# Patient Record
Sex: FEMALE | Race: WHITE | URBAN - METROPOLITAN AREA
[De-identification: names, ages, dates, MRNs, and addresses within clinical notes are randomized per-mention and may not be internally consistent; named-entity substitution may affect disease eponyms.]

---

## 2020-02-20 ENCOUNTER — OUTPATIENT (OUTPATIENT)
Dept: OUTPATIENT SERVICES | Facility: HOSPITAL | Age: 50
LOS: 1 days | Discharge: HOME | End: 2020-02-20
Payer: MEDICAID

## 2020-02-20 DIAGNOSIS — Z12.31 ENCOUNTER FOR SCREENING MAMMOGRAM FOR MALIGNANT NEOPLASM OF BREAST: ICD-10-CM

## 2020-02-20 PROCEDURE — 77063 BREAST TOMOSYNTHESIS BI: CPT | Mod: 26

## 2020-02-20 PROCEDURE — 77067 SCR MAMMO BI INCL CAD: CPT | Mod: 26

## 2020-03-06 ENCOUNTER — OUTPATIENT (OUTPATIENT)
Dept: OUTPATIENT SERVICES | Facility: HOSPITAL | Age: 50
LOS: 1 days | Discharge: HOME | End: 2020-03-06
Payer: OTHER GOVERNMENT

## 2020-03-06 DIAGNOSIS — R92.8 OTHER ABNORMAL AND INCONCLUSIVE FINDINGS ON DIAGNOSTIC IMAGING OF BREAST: ICD-10-CM

## 2020-03-06 PROCEDURE — 77065 DX MAMMO INCL CAD UNI: CPT | Mod: 26,LT

## 2020-03-06 PROCEDURE — G0279: CPT | Mod: 26

## 2020-03-06 PROCEDURE — 76641 ULTRASOUND BREAST COMPLETE: CPT | Mod: 26,LT

## 2020-10-29 ENCOUNTER — OUTPATIENT (OUTPATIENT)
Dept: OUTPATIENT SERVICES | Facility: HOSPITAL | Age: 50
LOS: 1 days | Discharge: HOME | End: 2020-10-29
Payer: MEDICAID

## 2020-10-29 DIAGNOSIS — R92.8 OTHER ABNORMAL AND INCONCLUSIVE FINDINGS ON DIAGNOSTIC IMAGING OF BREAST: ICD-10-CM

## 2020-10-29 PROCEDURE — 77065 DX MAMMO INCL CAD UNI: CPT | Mod: 26,LT

## 2020-10-29 PROCEDURE — G0279: CPT | Mod: 26

## 2021-04-27 ENCOUNTER — OUTPATIENT (OUTPATIENT)
Dept: OUTPATIENT SERVICES | Facility: HOSPITAL | Age: 51
LOS: 1 days | Discharge: HOME | End: 2021-04-27
Payer: MEDICAID

## 2021-04-27 DIAGNOSIS — R92.8 OTHER ABNORMAL AND INCONCLUSIVE FINDINGS ON DIAGNOSTIC IMAGING OF BREAST: ICD-10-CM

## 2021-04-27 PROCEDURE — 77066 DX MAMMO INCL CAD BI: CPT | Mod: 26

## 2021-04-27 PROCEDURE — G0279: CPT | Mod: 26

## 2021-10-28 ENCOUNTER — OUTPATIENT (OUTPATIENT)
Dept: OUTPATIENT SERVICES | Facility: HOSPITAL | Age: 51
LOS: 1 days | Discharge: HOME | End: 2021-10-28
Payer: MEDICAID

## 2021-10-28 DIAGNOSIS — R92.8 OTHER ABNORMAL AND INCONCLUSIVE FINDINGS ON DIAGNOSTIC IMAGING OF BREAST: ICD-10-CM

## 2021-10-28 PROCEDURE — G0279: CPT | Mod: 26

## 2021-10-28 PROCEDURE — 77065 DX MAMMO INCL CAD UNI: CPT | Mod: 26,LT

## 2022-05-16 ENCOUNTER — OUTPATIENT (OUTPATIENT)
Dept: OUTPATIENT SERVICES | Facility: HOSPITAL | Age: 52
LOS: 1 days | Discharge: HOME | End: 2022-05-16
Payer: MEDICAID

## 2022-05-16 DIAGNOSIS — R92.8 OTHER ABNORMAL AND INCONCLUSIVE FINDINGS ON DIAGNOSTIC IMAGING OF BREAST: ICD-10-CM

## 2022-05-16 PROCEDURE — G0279: CPT | Mod: 26

## 2022-05-16 PROCEDURE — 77066 DX MAMMO INCL CAD BI: CPT | Mod: 26

## 2022-10-31 ENCOUNTER — OUTPATIENT (OUTPATIENT)
Dept: OUTPATIENT SERVICES | Facility: HOSPITAL | Age: 52
LOS: 1 days | Discharge: HOME | End: 2022-10-31

## 2022-10-31 DIAGNOSIS — D25.9 LEIOMYOMA OF UTERUS, UNSPECIFIED: ICD-10-CM

## 2022-10-31 PROCEDURE — 72197 MRI PELVIS W/O & W/DYE: CPT | Mod: 26

## 2023-04-21 PROBLEM — Z00.00 ENCOUNTER FOR PREVENTIVE HEALTH EXAMINATION: Status: ACTIVE | Noted: 2023-04-21

## 2023-04-25 ENCOUNTER — APPOINTMENT (OUTPATIENT)
Dept: GYNECOLOGIC ONCOLOGY | Facility: CLINIC | Age: 53
End: 2023-04-25
Payer: COMMERCIAL

## 2023-04-25 VITALS
BODY MASS INDEX: 33.86 KG/M2 | WEIGHT: 184 LBS | DIASTOLIC BLOOD PRESSURE: 91 MMHG | SYSTOLIC BLOOD PRESSURE: 154 MMHG | HEIGHT: 62 IN

## 2023-04-25 PROCEDURE — 99204 OFFICE O/P NEW MOD 45 MIN: CPT

## 2023-04-25 NOTE — ASSESSMENT
[FreeTextEntry1] : 53 yo female, G 3 P 3003,( x 3) LMP  , with a history of uterine fibroids that are possibly increasing in size now presenting for second opinion with respect for surgical management. The patient is asymptomatic and denies any vaginal bleeding discharge or pelvic pain. She was evaluated by Dr. Rolon and informed that she needs an open hysterectomy. She does not desire surgery at this time and hence is here for a second opinion.\par

## 2023-04-25 NOTE — CHIEF COMPLAINT
[FreeTextEntry1] : The patient is scheduled for a hysterectomy with [facility] 5-18-23 is here for a second opinion.

## 2023-04-25 NOTE — HISTORY OF PRESENT ILLNESS
[FreeTextEntry1] : 53 yo female, G 3 P 3003,( x 3) LMP  , with a history of uterine fibroids that are possibly increasing in size now presenting for second opinion with respect for surgical management. The patient is asymptomatic and denies any vaginal bleeding discharge or pelvic pain. She was evaluated by Dr. Rolon and informed that she needs an open hysterectomy. She does not desire surgery at this time and hence is here for a second opinion.\par \par \par \par \par \par \par \par

## 2023-05-05 ENCOUNTER — OUTPATIENT (OUTPATIENT)
Dept: OUTPATIENT SERVICES | Facility: HOSPITAL | Age: 53
LOS: 1 days | End: 2023-05-05
Payer: COMMERCIAL

## 2023-05-05 DIAGNOSIS — Z12.31 ENCOUNTER FOR SCREENING MAMMOGRAM FOR MALIGNANT NEOPLASM OF BREAST: ICD-10-CM

## 2023-05-05 DIAGNOSIS — M79.671 PAIN IN RIGHT FOOT: ICD-10-CM

## 2023-05-05 PROCEDURE — 73650 X-RAY EXAM OF HEEL: CPT | Mod: 26,RT

## 2023-05-05 PROCEDURE — 77067 SCR MAMMO BI INCL CAD: CPT | Mod: 26

## 2023-05-05 PROCEDURE — 73650 X-RAY EXAM OF HEEL: CPT | Mod: RT

## 2023-05-05 PROCEDURE — 77063 BREAST TOMOSYNTHESIS BI: CPT | Mod: 26

## 2023-05-05 PROCEDURE — 77067 SCR MAMMO BI INCL CAD: CPT

## 2023-05-05 PROCEDURE — 77063 BREAST TOMOSYNTHESIS BI: CPT

## 2023-05-06 DIAGNOSIS — M79.671 PAIN IN RIGHT FOOT: ICD-10-CM

## 2023-05-06 DIAGNOSIS — Z12.31 ENCOUNTER FOR SCREENING MAMMOGRAM FOR MALIGNANT NEOPLASM OF BREAST: ICD-10-CM

## 2023-05-08 ENCOUNTER — OUTPATIENT (OUTPATIENT)
Dept: OUTPATIENT SERVICES | Facility: HOSPITAL | Age: 53
LOS: 1 days | End: 2023-05-08
Payer: COMMERCIAL

## 2023-05-08 ENCOUNTER — RESULT REVIEW (OUTPATIENT)
Age: 53
End: 2023-05-08

## 2023-05-08 DIAGNOSIS — D25.9 LEIOMYOMA OF UTERUS, UNSPECIFIED: ICD-10-CM

## 2023-05-08 DIAGNOSIS — Z00.8 ENCOUNTER FOR OTHER GENERAL EXAMINATION: ICD-10-CM

## 2023-05-08 PROCEDURE — 76830 TRANSVAGINAL US NON-OB: CPT

## 2023-05-08 PROCEDURE — 76830 TRANSVAGINAL US NON-OB: CPT | Mod: 26

## 2023-05-09 ENCOUNTER — NON-APPOINTMENT (OUTPATIENT)
Age: 53
End: 2023-05-09

## 2023-05-09 DIAGNOSIS — D25.9 LEIOMYOMA OF UTERUS, UNSPECIFIED: ICD-10-CM

## 2023-05-11 ENCOUNTER — APPOINTMENT (OUTPATIENT)
Dept: HEMATOLOGY ONCOLOGY | Facility: CLINIC | Age: 53
End: 2023-05-11

## 2023-05-18 NOTE — DISCUSSION/SUMMARY
[FreeTextEntry1] : REASON FOR VISIT:\par Ms. Nelly Fuller is a 52-year-old female who was referred by Dr. Missy Romero for cancer genetic counseling and risk assessment due to a family history of cancer. The patient was seen on 2023 through [facility]. The patient was unaccompanied.\par \par RELEVANT MEDICAL AND SURGICAL HISTORY:\par The patient reported no personal history of cancer. \par \par OTHER MEDICAL AND SURGICAL HISTORY:\par • None\par \par PAST OB/GYN HISTORY:\par Obstetrical History: \par Age at Menarche: 14\par Post-Menopausal: 51\par Age at First Live Birth: 27\par Oral Contraceptive Use: None\par Hormone Replacement Therapy: None\par \par CANCER SCREENING HISTORY: \par BREAST: Last mammogram 2023. Frequency: annual. No MRIs. Per patient benign bx 18-19y ago.\par GYN: Last visit 10/2022 with Dr. Rolon. Fibroid noted and was seen by Dr. Romero 4..\par Colon: None\par Skin: None\par \par SOCIAL HISTORY:\par • Tobacco-product use: None\par \par FAMILY HISTORY:\par Paternal ancestry was reported as [place]/ and maternal ancestry was reported as [place]. A detailed family history of cancer was ascertained, see below for pedigree. Of note:\par • Sister with breast cancer in her late 40s\par • Maternal aunt with breast cancer at 60 \par \par The remaining family history is unremarkable. According to the patient there are no other known cases of significant cancers in the family. To her knowledge no one else in the family has had germline testing for cancer susceptibility. \par 	\par RISK ASSESSMENT:\par Ms. Fuller's family history of cancer is suggestive of a hereditary cancer susceptibility syndrome. Variants in breast cancer susceptibility genes were of specific concern. \par 	 \par We recommended genetic testing for a breast/gyn cancers panel. This test analyzes 19 genes: ARNAV, BARD1, BRCA1, BRCA2, BRIP1, CDH1, CHEK2, EPCAM, MLH1, MSH2, MSH6, NF1, PALB2, PMS2, PTEN, RAD51C, RAD51D, STK11, TP53  \par \par We discussed the risks, benefits and limitations, financial cost and implications of genetic testing. We also discussed the psychosocial implications of genetic testing. Possible test results were reviewed with the patient, along with associated medical management options. The Genetic Information Non-discrimination Act ([place]) was also reviewed.\par \par The patient consented to the above-mentioned genetic testing panel. A sample was obtained from the patient in our clinic and will be sent to [facility] for analysis.\par \par PLAN:\par 1. The patient's sample will be sent to [facility] for analysis. \par 2. We will contact the patient once the results are available. Results generally return in 2-3 weeks.\par \par For any additional questions please call Cancer Genetics at [phone] or [phone].\par \par \par Maya Heath MS, [facility] [place]\par Genetic Counselor, Cancer Genetics\par \par \par

## 2023-05-25 ENCOUNTER — NON-APPOINTMENT (OUTPATIENT)
Age: 53
End: 2023-05-25

## 2023-05-25 NOTE — DISCUSSION/SUMMARY
[FreeTextEntry1] : REASON FOR VISIT:\par Ms. Nelly Fuller is a 52-year-old female who was called on 2023 for a discussion regarding her negative genetic test results related to hereditary cancer predisposition. \par \par RELEVANT MEDICAL AND SURGICAL HISTORY:\par The patient reported no personal history of cancer. \par \par OTHER MEDICAL AND SURGICAL HISTORY:\par • None\par \par PAST OB/GYN HISTORY:\par Obstetrical History: \par Age at Menarche: 14\par Post-Menopausal: 51\par Age at First Live Birth: 27\par Oral Contraceptive Use: None\par Hormone Replacement Therapy: None\par \par CANCER SCREENING HISTORY: \par BREAST: Last mammogram 2023. Frequency: annual. No MRIs. Per patient benign bx 18-19y ago.\par GYN: Last visit 10/2022 with Dr. Rolon. Fibroid noted and was seen by Dr. Romero 4..\par Colon: None\par Skin: None\par \par SOCIAL HISTORY:\par • Tobacco-product use: None\par \par TEST RESULTS: NEGATIVE\par \par NO pathogenic (disease-causing) variants or variants of uncertain significance were detected in the following genes: ARNAV, BARD1, BRCA1, BRCA2, BRIP1, CDH1, CHEK2, EPCAM, MLH1, MSH2, MSH6, NF1, PALB2, PMS2, PTEN, RAD51C, RAD51D, STK11, TP53\par \par RESULTS INTERPRETATION AND ASSESSMENT:\par Given Ms. Fuller’s current reported family history of cancer, and her negative genetic test results, the following screening guidelines and risk-reducing recommendations were discussed:\par • BREAST: Ms. Fuller's [facility] risk assessment is 5.8% 10-year risk and 17.4% lifetime risk. This does not put her in the high-risk category for breast cancer to qualify for breast MRI. In the absence of other indications, this patient should practice age-appropriate breast cancer screening as recommended for the general population. \par • OTHER: In the absence of other indications, the patient should practice age-appropriate cancer screening for all other organ systems as recommended for the general population.\par \par It is recommended that the patient discuss the importance of pursuing cancer genetic testing and counseling with her other relatives. There may be a pathogenic variant in the family which the patient did not inherit. We would be happy to meet with her family members or refer them to a genetic expert in their area.\par \par We also discussed the limitations of negative results:\par 1. The cause of the patient’s family history of cancer remains unknown. The cancer may have developed randomly, or due to environmental factors.  \par 2. This negative result does not completely rule out a hereditary basis for the reported history due to limitations in technology or a variant being present in an unidentified gene. \par 3. Variants in other genes would not be identified by this analysis, so this negative result does not rule out the low likelihood of having a mutation in a different hereditary cancer gene or the possibility of ever developing cancer.\par 4. It is possible there is a hereditary cancer predisposition gene mutation in the family, but the patient did not inherit it. \par \par Our knowledge of genetics and inherited cancer conditions is changing rapidly, therefore, we recommend that the patient contact our office, every 2 to 3 years, to discuss relevant advances in cancer genetics. We emphasize the importance of re-contacting us with updates regarding her personal and family history of cancer as well as any updates regarding additional cancer genetic test results performed for the patient and/or family members.  Such updates could possibly change our risk assessment and recommendations. \par \par PLAN:\par 1. Long-term management and surveillance should be based on the patient’s personal and family history and general population guidelines for all other cancers.\par 2. The patient was encouraged to contact us every 2-3 years to discuss relevant advances in cancer genetics, or sooner if there are any changes in her personal or family history of cancer.\par \par For any additional questions please call Cancer Genetics at [phone] or [phone].\par \par \par Maya Heath MS, [facility] [place]par Genetic Counselor, Cancer Genetics\par \par

## 2023-05-26 ENCOUNTER — APPOINTMENT (OUTPATIENT)
Dept: GYNECOLOGIC ONCOLOGY | Facility: CLINIC | Age: 53
End: 2023-05-26
Payer: COMMERCIAL

## 2023-05-26 VITALS
BODY MASS INDEX: 33.13 KG/M2 | HEIGHT: 62 IN | WEIGHT: 180 LBS | SYSTOLIC BLOOD PRESSURE: 159 MMHG | DIASTOLIC BLOOD PRESSURE: 96 MMHG

## 2023-05-26 PROCEDURE — 99214 OFFICE O/P EST MOD 30 MIN: CPT

## 2023-05-26 NOTE — HISTORY OF PRESENT ILLNESS
[FreeTextEntry1] : 53 yo female, G 3 P 3003,( x 3) LMP  , with a history of uterine fibroids that are possibly increasing in size now presenting for second opinion with respect for surgical management. The patient is asymptomatic and denies any vaginal bleeding discharge or pelvic pain. She was evaluated by Dr. Rolon and informed that she needs an open hysterectomy. She does not desire surgery at this time and hence is here for a second opinion.\par \par Genetic work up  \par NO pathogenic (disease-causing) variants or variants of uncertain significance were detected in the following genes: ARNAV, BARD1, BRCA1, BRCA2, BRIP1, CDH1, CHEK2, EPCAM, MLH1, MSH2, MSH6, NF1, PALB2, PMS2, PTEN, RAD51C, RAD51D, STK11, TP53\par \par Repeat sonogram\par FINDINGS:\par Uterus: 9.1 cm x 4.8 cm x 7.1 cm. 2 cm x 2.2 cm intramural fibroid. 6.7 x 6.3 x 5.7 cm fundal fibroid. These structures appear unchanged.\par Endometrium: 4 mm. Within normal limits.\par \par Right ovary: 2.0 cm x 1.3 cm x 1.8 cm. Within normal limits.\par Left ovary: 3.0 cm x 1.4 cm x 1.7 cm. Within normal limits.\par \par Fluid: None.\par \par IMPRESSION:\par Stable Fibroid uterus, without change.\par \par Thank you for the courtesy of this consult.\par \par Sincerely,\par \par Aditya Foley MD\par \par

## 2023-05-26 NOTE — DISCUSSION/SUMMARY
[Visit Time ___ Minutes] : [unfilled] minutes [Face to Face Time___ Minutes] : with [unfilled] minutes in face to face consultation. [Discuss Alternatives/Risks/Benefits w/Patient] : All alternatives, risks, and benefits were discussed with the patient/family and all questions were answered.  Patient expressed good understanding and appreciates the importance of follow up as recommended.

## 2023-05-26 NOTE — ASSESSMENT
[FreeTextEntry1] : 51 yo female, G 3 P 3003,( x 3) LMP  , with a history of uterine fibroids that are possibly increasing in size now presenting for second opinion with respect for surgical management. Her work up confirms stable fibroid uterus that continues to be asymptomatic.

## 2024-01-10 ENCOUNTER — RESULT REVIEW (OUTPATIENT)
Age: 54
End: 2024-01-10

## 2024-01-10 ENCOUNTER — OUTPATIENT (OUTPATIENT)
Dept: OUTPATIENT SERVICES | Facility: HOSPITAL | Age: 54
LOS: 1 days | End: 2024-01-10
Payer: COMMERCIAL

## 2024-01-10 DIAGNOSIS — R10.2 PELVIC AND PERINEAL PAIN: ICD-10-CM

## 2024-01-10 DIAGNOSIS — Z00.8 ENCOUNTER FOR OTHER GENERAL EXAMINATION: ICD-10-CM

## 2024-01-10 PROCEDURE — 76830 TRANSVAGINAL US NON-OB: CPT | Mod: 26

## 2024-01-10 PROCEDURE — 76830 TRANSVAGINAL US NON-OB: CPT

## 2024-01-11 DIAGNOSIS — R10.2 PELVIC AND PERINEAL PAIN: ICD-10-CM

## 2024-01-12 ENCOUNTER — NON-APPOINTMENT (OUTPATIENT)
Age: 54
End: 2024-01-12

## 2024-01-12 ENCOUNTER — APPOINTMENT (OUTPATIENT)
Dept: ORTHOPEDIC SURGERY | Facility: CLINIC | Age: 54
End: 2024-01-12
Payer: COMMERCIAL

## 2024-01-12 VITALS — HEIGHT: 62 IN

## 2024-01-12 PROCEDURE — 99203 OFFICE O/P NEW LOW 30 MIN: CPT

## 2024-01-12 NOTE — HISTORY OF PRESENT ILLNESS
[de-identified] : 53-year-old female for evaluation of right ankle pain.  Patient reports was walking 2 days ago when she missed stepped causing her to invert her right ankle and fall.  Reports pain and swelling laterally greater than medially.  She seen at [facility] MD where x-rays of right foot and ankle were taken which confirmed no acute fractures.  He was placed in an Aircast and advised follow-up with an orthopedic specialist.

## 2024-01-12 NOTE — DISCUSSION/SUMMARY
[de-identified] : Treatment plan is discussed:  I recommended anti-inflammatory medication. Patient agrees to taking Advil/Ibuprofen OTC as needed for pain. Benefits discussed. Confirmed no contraindication to NSAIDs.  I recommended patient rest, ice, compress, and elevate the ankle regularly. Encouraged activity modification as tolerable. Encouraged gentle range of motion to avoid stiffness. no gym /sports until follow-up evaluation.  The patient was placed in a right tall Cam walker boot.  Patient instructed to wear the boot at all times past when active and ambulating.  Patient may remove the boot when showering/sleeping.  Patient understands that the first 2 weeks are the worst in regard to pain and swelling. Patient understands that residual pain and swelling may last for up to 6 months-1 year.  All questions and concerns addressed to patient's satisfaction. Patient expresses full understanding of treatment plan. Patient will follow up in 3-4 weeks with for further evaluation treatment.

## 2024-01-12 NOTE — IMAGING
[de-identified] :  physical examination of the right ankle: Mild swelling appreciated laterally greater than medially.  No ecchymosis or erythema appreciated.  Skin is intact.  Patient tender to palpation over the ATFL.  No tenderness over the lateral or medial malleolus.  No tenderness of the deltoid ligament, PT FL, or CFL ligaments.  Stable to varus and valgus stress.  Able to bear weight and ambulate at this time however experiences pain when doing so.  Sensorimotor intact distally.  Neurovascularly intact.  X-rays of right foot and ankle taken in the office today:  No acute fractures, subluxations, or dislocations.

## 2024-02-05 ENCOUNTER — APPOINTMENT (OUTPATIENT)
Dept: ORTHOPEDIC SURGERY | Facility: CLINIC | Age: 54
End: 2024-02-05
Payer: COMMERCIAL

## 2024-02-05 DIAGNOSIS — S93.401A SPRAIN OF UNSPECIFIED LIGAMENT OF RIGHT ANKLE, INITIAL ENCOUNTER: ICD-10-CM

## 2024-02-05 PROCEDURE — 99203 OFFICE O/P NEW LOW 30 MIN: CPT

## 2024-02-06 PROBLEM — S93.401A SPRAIN OF RIGHT ANKLE, INITIAL ENCOUNTER: Status: ACTIVE | Noted: 2024-01-12

## 2024-02-06 NOTE — DATA REVIEWED
[FreeTextEntry1] : I reviewed the x-rays taken at her last visit which are negative for fracture or dislocation.

## 2024-02-06 NOTE — PHYSICAL EXAM
[de-identified] : She is alert oriented x 3.  Pleasant cooperative.  I examined her left lower extremity.  She is minimally tender at the malleoli.  Tender at the ATFL.  Ankle is stable.  Compartment soft compressible.  Alignment maintained.  Neurovascular intact distally.

## 2024-02-06 NOTE — HISTORY OF PRESENT ILLNESS
[de-identified] : Patient is here for follow-up of a right ankle sprain which occurred couple weeks ago.  She is doing well.  She really has much less pain.  She still gets some occasional discomfort over the ATFL.  Denies any pain elsewhere.

## 2024-02-06 NOTE — DISCUSSION/SUMMARY
[de-identified] : I discussed the patient's findings with her.  At this time I would like the patient to initiate a home exercise program as well as anti-inflammatory medication.  I will see her back on as-needed basis.  All questions sought and answered.

## 2024-04-05 ENCOUNTER — APPOINTMENT (OUTPATIENT)
Dept: GYNECOLOGIC ONCOLOGY | Facility: CLINIC | Age: 54
End: 2024-04-05
Payer: COMMERCIAL

## 2024-04-05 DIAGNOSIS — D25.1 INTRAMURAL LEIOMYOMA OF UTERUS: ICD-10-CM

## 2024-04-05 DIAGNOSIS — R10.2 PELVIC AND PERINEAL PAIN: ICD-10-CM

## 2024-04-05 DIAGNOSIS — D25.0 SUBMUCOUS LEIOMYOMA OF UTERUS: ICD-10-CM

## 2024-04-05 PROCEDURE — 99215 OFFICE O/P EST HI 40 MIN: CPT

## 2024-04-05 NOTE — PHYSICAL EXAM
[Chaperone Present] : A chaperone was present in the examining room during all aspects of the physical examination [TextEntry] : Well - developed, well-nourished female in no acute distress HEENT - wnl Breasts - symmetrical w/o masses or nipple discharge Abdomen - soft, non-tender, +BS Back - no CVA tenderness or spinal tenderness Extremities - w/o clubbing, cyanosis, no lesions noted Neuro - AAOx3  Pelvic Exam External Genitalia - with out lesions Vagina - mucosa atrophic, no lesions noted Cervix - no lesions Uterus - 16 wk size, nontender, mobile Adnexa - no palpable masses or tenderness b/l Rectovaginal - confirmatory

## 2024-04-05 NOTE — HISTORY OF PRESENT ILLNESS
[FreeTextEntry1] : 54 yo female, G 3 P 3003,( x 3) LMP  , with a history of uterine fibroids that are possibly increasing in size now presenting for second opinion with respect for surgical management. The patient is asymptomatic and denies any vaginal bleeding discharge or pelvic pain. She was evaluated by Dr. Rolon and informed that she needs an open hysterectomy. She does not desire surgery at this time and hence is here for a second opinion.  Genetic work up NO pathogenic (disease-causing) variants or variants of uncertain significance were detected in the following genes: ARNAV, BARD1, BRCA1, BRCA2, BRIP1, CDH1, CHEK2, EPCAM, MLH1, MSH2, MSH6, NF1, PALB2, PMS2, PTEN, RAD51C, RAD51D, STK11, TP53    TVS:  01/10/2024 COMPARISON: Pelvic ultrasound 2023  FINDINGS: Uterus: 13.2 cm x 6.3 cm x 7.5 cm. Uterine fibroids as follows: -6.5 x 6.2 cm posterior uterine body/fundus intramural fibroid with likely subserosal extension -2.7 x 2.6 cm anterior uterine fundal intramural fibroid Endometrium: 6 mm, which is mildly thickened.  Right ovary: 2.1 cm x 1.4 cm x 1.6 cm. Within normal limits. Doppler flow is noted to the right ovary. Left ovary: 2.3 cm x 1.1 cm x 1.6 cm. Within normal limits. Doppler flow is noted to the left ovary.  Fluid: None.  IMPRESSION: Mild thickening of the endometrium. Recommend follow-up with OB/GYN. Fibroid uterus.  Last seen in GYN/ONC on 23 where repeat sono confirmed stability of uterus in 6 months and surgery not indicated as patient was asymptomatic.  Patient was agreeable to conservative management.  However she is now symptomatic and is requesting definitive surgery.  She states that she has pain in LLQ and is no longer interested in conservative management.

## 2024-04-05 NOTE — PLAN
[TextEntry] : EUA TLH/BSO and colpopexy Options for surgical management discussed. Procedures offered patient included laparoscopic hysterectomy with bilateral salpingo-oophorectomy, along with possible staging.   The risk benefits and alternative to the recommended treatments were discussed. She was informed about potential complications of surgery including but not limited to bowel, bladder, and ureteral injuries. Infectious morbidity, along with bleeding and thromboembolic events were also discussed.   She showed clear understanding, was given the opportunity to ask questions and is amenable to the above surgical treatment. The patient will be setup for the above procedure in the near future.

## 2024-04-05 NOTE — ASSESSMENT
[FreeTextEntry1] : 54 yo female, G 3 P 3003,( x 3) LMP  , with a history of symptomatic uterine fibroids with c/o LLQ pain increasing in frequency and severity now requesting definitive surgery.

## 2024-05-17 ENCOUNTER — OUTPATIENT (OUTPATIENT)
Dept: OUTPATIENT SERVICES | Facility: HOSPITAL | Age: 54
LOS: 1 days | End: 2024-05-17
Payer: COMMERCIAL

## 2024-05-17 DIAGNOSIS — Z12.31 ENCOUNTER FOR SCREENING MAMMOGRAM FOR MALIGNANT NEOPLASM OF BREAST: ICD-10-CM

## 2024-05-17 PROCEDURE — 77063 BREAST TOMOSYNTHESIS BI: CPT

## 2024-05-17 PROCEDURE — 77067 SCR MAMMO BI INCL CAD: CPT

## 2024-05-17 PROCEDURE — 77067 SCR MAMMO BI INCL CAD: CPT | Mod: 26

## 2024-05-17 PROCEDURE — 77063 BREAST TOMOSYNTHESIS BI: CPT | Mod: 26

## 2024-05-18 DIAGNOSIS — Z12.31 ENCOUNTER FOR SCREENING MAMMOGRAM FOR MALIGNANT NEOPLASM OF BREAST: ICD-10-CM

## 2024-07-09 ENCOUNTER — OUTPATIENT (OUTPATIENT)
Dept: OUTPATIENT SERVICES | Facility: HOSPITAL | Age: 54
LOS: 1 days | End: 2024-07-09
Payer: MEDICAID

## 2024-07-09 VITALS
TEMPERATURE: 97 F | HEART RATE: 72 BPM | HEIGHT: 63 IN | DIASTOLIC BLOOD PRESSURE: 88 MMHG | RESPIRATION RATE: 16 BRPM | WEIGHT: 186.95 LBS | SYSTOLIC BLOOD PRESSURE: 142 MMHG | OXYGEN SATURATION: 98 %

## 2024-07-09 DIAGNOSIS — Z01.818 ENCOUNTER FOR OTHER PREPROCEDURAL EXAMINATION: ICD-10-CM

## 2024-07-09 DIAGNOSIS — D25.1 INTRAMURAL LEIOMYOMA OF UTERUS: ICD-10-CM

## 2024-07-09 DIAGNOSIS — Z98.890 OTHER SPECIFIED POSTPROCEDURAL STATES: Chronic | ICD-10-CM

## 2024-07-09 LAB
ALBUMIN SERPL ELPH-MCNC: 4.3 G/DL — SIGNIFICANT CHANGE UP (ref 3.5–5.2)
ALP SERPL-CCNC: 95 U/L — SIGNIFICANT CHANGE UP (ref 30–115)
ALT FLD-CCNC: 31 U/L — SIGNIFICANT CHANGE UP (ref 0–41)
ANION GAP SERPL CALC-SCNC: 15 MMOL/L — HIGH (ref 7–14)
AST SERPL-CCNC: 21 U/L — SIGNIFICANT CHANGE UP (ref 0–41)
BASOPHILS # BLD AUTO: 0.04 K/UL — SIGNIFICANT CHANGE UP (ref 0–0.2)
BASOPHILS NFR BLD AUTO: 0.5 % — SIGNIFICANT CHANGE UP (ref 0–1)
BILIRUB SERPL-MCNC: 0.2 MG/DL — SIGNIFICANT CHANGE UP (ref 0.2–1.2)
BLD GP AB SCN SERPL QL: SIGNIFICANT CHANGE UP
BUN SERPL-MCNC: 25 MG/DL — HIGH (ref 10–20)
CALCIUM SERPL-MCNC: 9.6 MG/DL — SIGNIFICANT CHANGE UP (ref 8.4–10.5)
CHLORIDE SERPL-SCNC: 105 MMOL/L — SIGNIFICANT CHANGE UP (ref 98–110)
CO2 SERPL-SCNC: 21 MMOL/L — SIGNIFICANT CHANGE UP (ref 17–32)
CREAT SERPL-MCNC: 0.9 MG/DL — SIGNIFICANT CHANGE UP (ref 0.7–1.5)
EGFR: 76 ML/MIN/1.73M2 — SIGNIFICANT CHANGE UP
EOSINOPHIL # BLD AUTO: 0.16 K/UL — SIGNIFICANT CHANGE UP (ref 0–0.7)
EOSINOPHIL NFR BLD AUTO: 2.2 % — SIGNIFICANT CHANGE UP (ref 0–8)
GLUCOSE SERPL-MCNC: 118 MG/DL — HIGH (ref 70–99)
HCT VFR BLD CALC: 42.3 % — SIGNIFICANT CHANGE UP (ref 37–47)
HGB BLD-MCNC: 13.9 G/DL — SIGNIFICANT CHANGE UP (ref 12–16)
IMM GRANULOCYTES NFR BLD AUTO: 0.3 % — SIGNIFICANT CHANGE UP (ref 0.1–0.3)
LYMPHOCYTES # BLD AUTO: 2.85 K/UL — SIGNIFICANT CHANGE UP (ref 1.2–3.4)
LYMPHOCYTES # BLD AUTO: 38.7 % — SIGNIFICANT CHANGE UP (ref 20.5–51.1)
MCHC RBC-ENTMCNC: 29.6 PG — SIGNIFICANT CHANGE UP (ref 27–31)
MCHC RBC-ENTMCNC: 32.9 G/DL — SIGNIFICANT CHANGE UP (ref 32–37)
MCV RBC AUTO: 90.2 FL — SIGNIFICANT CHANGE UP (ref 81–99)
MONOCYTES # BLD AUTO: 0.52 K/UL — SIGNIFICANT CHANGE UP (ref 0.1–0.6)
MONOCYTES NFR BLD AUTO: 7.1 % — SIGNIFICANT CHANGE UP (ref 1.7–9.3)
NEUTROPHILS # BLD AUTO: 3.77 K/UL — SIGNIFICANT CHANGE UP (ref 1.4–6.5)
NEUTROPHILS NFR BLD AUTO: 51.2 % — SIGNIFICANT CHANGE UP (ref 42.2–75.2)
NRBC # BLD: 0 /100 WBCS — SIGNIFICANT CHANGE UP (ref 0–0)
PLATELET # BLD AUTO: 232 K/UL — SIGNIFICANT CHANGE UP (ref 130–400)
PMV BLD: 11.2 FL — HIGH (ref 7.4–10.4)
POTASSIUM SERPL-MCNC: 4.1 MMOL/L — SIGNIFICANT CHANGE UP (ref 3.5–5)
POTASSIUM SERPL-SCNC: 4.1 MMOL/L — SIGNIFICANT CHANGE UP (ref 3.5–5)
PROT SERPL-MCNC: 6.7 G/DL — SIGNIFICANT CHANGE UP (ref 6–8)
RBC # BLD: 4.69 M/UL — SIGNIFICANT CHANGE UP (ref 4.2–5.4)
RBC # FLD: 12.3 % — SIGNIFICANT CHANGE UP (ref 11.5–14.5)
SODIUM SERPL-SCNC: 141 MMOL/L — SIGNIFICANT CHANGE UP (ref 135–146)
WBC # BLD: 7.36 K/UL — SIGNIFICANT CHANGE UP (ref 4.8–10.8)
WBC # FLD AUTO: 7.36 K/UL — SIGNIFICANT CHANGE UP (ref 4.8–10.8)

## 2024-07-09 PROCEDURE — 93005 ELECTROCARDIOGRAM TRACING: CPT

## 2024-07-09 PROCEDURE — 93010 ELECTROCARDIOGRAM REPORT: CPT

## 2024-07-09 PROCEDURE — 80053 COMPREHEN METABOLIC PANEL: CPT

## 2024-07-09 PROCEDURE — 86900 BLOOD TYPING SEROLOGIC ABO: CPT

## 2024-07-09 PROCEDURE — 36415 COLL VENOUS BLD VENIPUNCTURE: CPT

## 2024-07-09 PROCEDURE — 86850 RBC ANTIBODY SCREEN: CPT

## 2024-07-09 PROCEDURE — 99214 OFFICE O/P EST MOD 30 MIN: CPT | Mod: 25

## 2024-07-09 PROCEDURE — 85025 COMPLETE CBC W/AUTO DIFF WBC: CPT

## 2024-07-09 PROCEDURE — 86901 BLOOD TYPING SEROLOGIC RH(D): CPT

## 2024-07-09 NOTE — H&P PST ADULT - NSICDXFAMILYHX_GEN_ALL_CORE_FT
FAMILY HISTORY:  Father  Still living? Unknown  FH: CAD (coronary artery disease), Age at diagnosis: Age Unknown    Mother  Still living? Unknown  Family history of diabetes mellitus (DM), Age at diagnosis: Age Unknown  FH: CAD (coronary artery disease), Age at diagnosis: Age Unknown    Sibling  Still living? Unknown  FH: breast cancer, Age at diagnosis: Age Unknown

## 2024-07-09 NOTE — H&P PST ADULT - HISTORY OF PRESENT ILLNESS
52 Y/O FEMALE PT TO PAST WITH HX              PT NOW FOR SCHEDULED PROCEDURE. PT DENIES ANY CP SOB PALP COUGH DYSURIA FEVER URI. PT ABLE TO SYBIL 1-2 FOS W/O SOB  Anesthesia Alert  NO--Difficult Airway  NO--History of neck surgery or radiation  NO--Limited ROM of neck  NO--History of Malignant hyperthermia  NO--Personal or family history of Pseudocholinesterase deficiency.  NO--Prior Anesthesia Complication  NO--Latex Allergy  NO--Loose teeth  NO--History of Rheumatoid Arthritis  NO--AURELIA  NO--Bleeding risk  NO--Other_____   52 Y/O FEMALE PT TO PAST WITH C/O LOWER ABD / PELVIC PAIN SHARP, RADIATING TO BACK APPROX 3 MO AGO DENIES BLEEDING OR PAIN AT PRESENT PT NOW FOR SCHEDULED PROCEDURE ( HYSTERECTOMY) . PT DENIES ANY CP SOB PALP COUGH DYSURIA FEVER URI.   RCRI CLASS I  DASI 9 METS  Anesthesia Alert  CLASS III  NO--History of neck surgery or radiation  NO--Limited ROM of neck  NO--History of Malignant hyperthermia  NO--Personal or family history of Pseudocholinesterase deficiency.  NO--Prior Anesthesia Complication  NO--Latex Allergy  NO--Loose teeth  NO--History of Rheumatoid Arthritis  NO--AURELIA  NO--Bleeding risk  NO--Other_____   52 Y/O FEMALE PT TO PAST WITH C/O LOWER ABD / PELVIC PAIN SHARP, RADIATING TO BACK APPROX 3 MO AGO DENIES BLEEDING OR PAIN AT PRESENT PT NOW FOR SCHEDULED PROCEDURE ( HYSTERECTOMY) . PT DENIES ANY CP SOB PALP COUGH DYSURIA FEVER URI.   RCRI CLASS I  DASI 9 METS  Anesthesia Alert  CLASS III  NO--History of neck surgery or radiation  NO--Limited ROM of neck  NO--History of Malignant hyperthermia  NO--Personal or family history of Pseudocholinesterase deficiency.  NO--Prior Anesthesia Complication  NO--Latex Allergy  NO--Loose teeth  NO--History of Rheumatoid Arthritis  NO--AURELIA  NO--Bleeding risk  NO--Other_____  RCRI CLASS I  DASI 9 METS

## 2024-07-09 NOTE — H&P PST ADULT - NS PRO LAST MENSTRUAL DATE
Return call to [facility] and they will se pt starting Monday the 27 of June  For PT and OT will monitor POX during those visits > 1.5 YRS

## 2024-07-09 NOTE — H&P PST ADULT - ATTENDING COMMENTS
52 yo female, G 3 P 3003,( x 3) LMP  , with a history of symptomatic 16 week  uterine fibroids with c/o LLQ pain increasing in frequency and severity now requesting definitive surgery.    Assessment  Fibroids, intramural (218.1) (D25.1)  Fibroids, submucosal (218.0) (D25.0)  Pelvic pain in female (625.9) (R10.2)     Plan   EUA TLH/BSO and colpopexy  Options for surgical management discussed. Procedures offered patient included laparoscopic hysterectomy with bilateral salpingo-oophorectomy, along with possible staging.    The risk benefits and alternative to the recommended treatments were discussed. She was informed about potential complications of surgery including but not limited to bowel, bladder, and ureteral injuries. Infectious morbidity, along with bleeding and thromboembolic events were also discussed.  She showed clear understanding, was given the opportunity to ask questions and is amenable to the above surgical treatment.

## 2024-07-10 DIAGNOSIS — D25.1 INTRAMURAL LEIOMYOMA OF UTERUS: ICD-10-CM

## 2024-07-10 DIAGNOSIS — Z01.818 ENCOUNTER FOR OTHER PREPROCEDURAL EXAMINATION: ICD-10-CM

## 2024-07-19 NOTE — ASU PATIENT PROFILE, ADULT - MEDICATION HERBAL REMEDIES, PROFILE
"Physical Therapy  Evaluation    Kwan Palacio   MRN: [id number]   Admitting Diagnosis: S/P AVR (aortic valve replacement)    PT Received On: 07/22/17  PT Start Time: 1000     PT Stop Time: 1025    PT Total Time (min): 25 min       Billable Minutes:  Evaluation 25    Diagnosis: S/P AVR (aortic valve replacement)  MVR, aortic root repair    Past Medical History:   Diagnosis Date    Dislocation of shoulder, left, closed       History reviewed. No pertinent surgical history.      General Precautions: Standard,  (fall, sternal)           Patient History:  Lives With: significant other  Living Arrangements: apartment  Equipment Currently Used at Home: none    Previous Level of Function:  Ambulation Skills: independent  Transfer Skills: independent  ADL Skills: independent  Work/Leisure Activity: independent    Subjective:  Communicated with RN prior to session.  "I'm telling you this is the hardest thing I've ever gone through."   Pt c/o 10/10 sternal pain.      Objective:    Found supine with tele, pulse ox, Bp cuff, Nguyen, CT to suction, and IV intact.  Family present.     Cognitive Exam:  Oriented to: Person, Place, Time and Situation    Follows Commands/attention: Follows multistep  commands  Communication: clear/fluent  Safety awareness/insight to disability: intact    Physical Exam:  Lower Extremity Range of Motion:  Right Lower Extremity: WFL  Left Lower Extremity: WFL    Lower Extremity Strength:  Right Lower Extremity: WFL  Left Lower Extremity: WFL     Functional Mobility:  Bed Mobility:   supine<>sit with max assist    Transfers:   sit>stand with B HHA and CGA    Gait:    Amb 5 feet with B HHA and CGA      Balance:   Static Sit: FAIR-: Maintains without assist but inconsistent   Dynamic Sit: POOR: N/A  Static Stand: FAIR: Maintains without assist but unable to take challenges  Dynamic stand: FAIR: Needs CONTACT GUARD during gait    Therapeutic Activities and Exercises:  Educated on sternal " precautions.    AM-PAC 6 CLICK MOBILITY  How much help from another person does this patient currently need?   1 = Unable, Total/Dependent Assistance  2 = A lot, Maximum/Moderate Assistance  3 = A little, Minimum/Contact Guard/Supervision  4 = None, Modified [place]/Independent          AM-PAC Raw Score CMS G-Code Modifier Level of Impairment Assistance   6 % Total / Unable   7 - 9 CM 80 - 100% Maximal Assist   10 - 14 CL 60 - 80% Moderate Assist   15 - 19 CK 40 - 60% Moderate Assist   20 - 22 CJ 20 - 40% Minimal Assist   23 CI 1-20% SBA / CGA   24 CH 0% Independent/ Mod I     Patient left supine with all lines intact and call button in reach.    Assessment:   Kwan Palacio is a 24 y.o. male with a medical diagnosis of S/P AVR (aortic valve replacement) and presents with decreased functional independence secondary to decreased balance and endurance.    Rehab identified problem list/impairments: Rehab identified problem list/impairments: impaired functional mobilty, impaired endurance, gait instability, pain    Rehab potential is good.    Activity tolerance: Good    Discharge recommendations: Discharge Facility/Level Of Care Needs: home     Barriers to discharge: Barriers to Discharge: None    Equipment recommendations: Equipment Needed After Discharge: none     GOALS:   1. Supine to sit with Stand-by Assistance  2. Sit to supine with Stand-by Assistance  3. Sit to stand transfer with Stand-by Assistance  4. Gait  x 100 feet with Stand-by Assistance       PLAN:    Patient to be seen 5 x/week to address the above listed problems via therapeutic exercises, therapeutic activities, gait training          Rossana Armstrong, PT  07/23/2017       no

## 2024-07-19 NOTE — ASU PATIENT PROFILE, ADULT - FALL HARM RISK - UNIVERSAL INTERVENTIONS
Bed in lowest position, wheels locked, appropriate side rails in place/Call bell, personal items and telephone in reach/Instruct patient to call for assistance before getting out of bed or chair/Non-slip footwear when patient is out of bed/[place] to call system/Physically safe environment - no spills, clutter or unnecessary equipment/Purposeful Proactive Rounding/Room/bathroom lighting operational, light cord in reach

## 2024-07-22 ENCOUNTER — TRANSCRIPTION ENCOUNTER (OUTPATIENT)
Age: 54
End: 2024-07-22

## 2024-07-22 ENCOUNTER — OUTPATIENT (OUTPATIENT)
Dept: OUTPATIENT SERVICES | Facility: HOSPITAL | Age: 54
LOS: 1 days | Discharge: HOME | End: 2024-07-22
Payer: MEDICAID

## 2024-07-22 ENCOUNTER — APPOINTMENT (OUTPATIENT)
Dept: GYNECOLOGIC ONCOLOGY | Facility: HOSPITAL | Age: 54
End: 2024-07-22
Payer: COMMERCIAL

## 2024-07-22 ENCOUNTER — RESULT REVIEW (OUTPATIENT)
Age: 54
End: 2024-07-22

## 2024-07-22 VITALS
HEART RATE: 78 BPM | TEMPERATURE: 98 F | DIASTOLIC BLOOD PRESSURE: 77 MMHG | RESPIRATION RATE: 15 BRPM | OXYGEN SATURATION: 100 % | SYSTOLIC BLOOD PRESSURE: 161 MMHG

## 2024-07-22 VITALS
HEIGHT: 63 IN | HEART RATE: 74 BPM | DIASTOLIC BLOOD PRESSURE: 81 MMHG | OXYGEN SATURATION: 99 % | WEIGHT: 186.95 LBS | SYSTOLIC BLOOD PRESSURE: 169 MMHG | TEMPERATURE: 98 F

## 2024-07-22 DIAGNOSIS — D25.1 INTRAMURAL LEIOMYOMA OF UTERUS: ICD-10-CM

## 2024-07-22 DIAGNOSIS — Z98.890 OTHER SPECIFIED POSTPROCEDURAL STATES: Chronic | ICD-10-CM

## 2024-07-22 PROCEDURE — C9399: CPT

## 2024-07-22 PROCEDURE — C1889: CPT

## 2024-07-22 PROCEDURE — 57425 LAPAROSCOPY SURG COLPOPEXY: CPT

## 2024-07-22 PROCEDURE — 58573 TLH W/T/O UTERUS OVER 250 G: CPT

## 2024-07-22 PROCEDURE — 88307 TISSUE EXAM BY PATHOLOGIST: CPT | Mod: 26

## 2024-07-22 PROCEDURE — 88307 TISSUE EXAM BY PATHOLOGIST: CPT

## 2024-07-22 RX ORDER — OXYCODONE HYDROCHLORIDE 100 MG/5ML
1 SOLUTION ORAL
Qty: 10 | Refills: 0
Start: 2024-07-22

## 2024-07-22 RX ORDER — ACETAMINOPHEN 325 MG
2 TABLET ORAL
Qty: 56 | Refills: 0
Start: 2024-07-22 | End: 2024-07-28

## 2024-07-22 RX ORDER — SIMETHICONE 40MG/0.6ML
1 SUSPENSION, DROPS(FINAL DOSAGE FORM)(ML) ORAL
Qty: 42 | Refills: 0
Start: 2024-07-22 | End: 2024-07-28

## 2024-07-22 RX ORDER — HYDROCHLOROTHIAZIDE 25 MG
1 TABLET ORAL
Refills: 0 | DISCHARGE

## 2024-07-22 RX ORDER — SIMVASTATIN 40 MG
1 TABLET ORAL
Refills: 0 | DISCHARGE

## 2024-07-22 RX ORDER — MORPHINE SULFATE 100 MG/1
4 TABLET, EXTENDED RELEASE ORAL
Refills: 0 | Status: DISCONTINUED | OUTPATIENT
Start: 2024-07-22 | End: 2024-07-22

## 2024-07-22 RX ORDER — OXYCODONE HYDROCHLORIDE 100 MG/5ML
5 SOLUTION ORAL ONCE
Refills: 0 | Status: DISCONTINUED | OUTPATIENT
Start: 2024-07-22 | End: 2024-07-22

## 2024-07-22 RX ORDER — DEXTROSE MONOHYDRATE AND SODIUM CHLORIDE 5; .3 G/100ML; G/100ML
1000 INJECTION, SOLUTION INTRAVENOUS
Refills: 0 | Status: DISCONTINUED | OUTPATIENT
Start: 2024-07-22 | End: 2024-07-22

## 2024-07-22 RX ORDER — ONDANSETRON HYDROCHLORIDE 2 MG/ML
1 INJECTION INTRAMUSCULAR; INTRAVENOUS
Qty: 1 | Refills: 0
Start: 2024-07-22

## 2024-07-22 RX ADMIN — MORPHINE SULFATE 4 MILLIGRAM(S): 100 TABLET, EXTENDED RELEASE ORAL at 16:48

## 2024-07-22 RX ADMIN — OXYCODONE HYDROCHLORIDE 5 MILLIGRAM(S): 100 SOLUTION ORAL at 18:57

## 2024-07-22 RX ADMIN — MORPHINE SULFATE 4 MILLIGRAM(S): 100 TABLET, EXTENDED RELEASE ORAL at 16:18

## 2024-07-22 RX ADMIN — OXYCODONE HYDROCHLORIDE 5 MILLIGRAM(S): 100 SOLUTION ORAL at 18:27

## 2024-07-22 NOTE — ASU DISCHARGE PLAN (ADULT/PEDIATRIC) - ASU DC SPECIAL INSTRUCTIONSFT
DIET  - You may resume your normal diet. Eat a well-balanced diet. You may prefer to eat light meals for the first few days after surgery.  Drink plenty of water (6-8 glasses a day).    - Please take Senna (stool softener) daily until you have normal bowel movements.  If you have constipation or don't have a bowel movement 2-3 days after surgery, please try a mild laxative such as Miralax.   - You may take zofran as needed for nausea (this dissolves under your tongue).     ACTIVITY:   - No heavy lifting/pushing/pulling for 6 weeks. Do not lift anything more than 10 lbs (such as laundry, groceries, children, pets), vacuum, push heavy doors or grocery carts, etc, for 6 weeks.  - You may climb stairs as tolerated.  -  Do not put anything in the vagina for at least 6 weeks after surgery unless otherwise instructed by your doctor (including tampons, douching, sexual intercourse, etc).  -  Avoid sitting or lying in bed for more than 2 hours at a time while you are awake to reduce your risk of blood clots.    WOUND CARE: You have 4 incisions that are covered with surgical glue (Dermabond).  After 24 hours you may get your incisions wet.  Do not submerge in water (no tub baths or pools), showering is OK.  The Dermabond will loosen from the skin and fall off in 5 to 10 days.  Do not apply any ointments, lotions, creams or tape over the Dermabond.    PAIN MANAGEMENT:   Alternate Tylenol and ibuprofen/Motrin (if you are eligible). Each of these medications can be taken every six hours. Try to stagger them so that you are taking something for pain every three hours (ex. Take Motrin at 12:00, Tylenol at 3:00, Motrin at 6:00, etc.) to maximize pain relief.  - Tylenol – 500mg every 6 hours as needed. The maximum dose of Tylenol is 3000 mg in 24 hours.  - Motrin/Ibuprofen - 600 mg every 6 hours as needed (try to take with food). The maximum dose of Motrin/ibuprofen is 2400mg in 24 hours  - Oxycodone 5mg every 6 hours as needed for severe pain (limit use as this is a narcotic and can cause sedation, nausea and constipation.  -  A warm shower, heating pad, and/or walking may help.    WHAT TO EXPECT AT HOME  - Recovery from surgery is generally 2-4 weeks, but sometimes longer for more strenuous activity. It is normal to be very tired during this time.  - It is normal to have some drainage or a small amount of vaginal bleeding after surgery that would require the use of a light pantiliner. This discharge may last up to 6 weeks. The bleeding and discharge should be light and should have no odor.  - You may experience gas pain, abdominal swelling, or shoulder pain for 24-72 hours after surgery. This is from the carbon dioxide gas put into your abdomen to better visualize your organs. A warm shower, heating pad, and/or walking may help.    WHEN TO CALL YOUR DOCTOR:  - Fever (>100.4°F or 38.0°C) or chills  - Incision problems such as redness, warmth, swelling, or foul smelling drainage.  - Severe nausea or persistent vomiting.  - Bright red vaginal bleeding (soaking >1 pad/hour) or foul smelling vaginal drainage.  - Severe pain not relieved with pain medication.  - Pain with urination, cloudy urine, or foul smelling urine.  - Or if you have any other problems or questions.

## 2024-07-22 NOTE — BRIEF OPERATIVE NOTE - OPERATION/FINDINGS
Normal external genital. Enlarged, globular uterus, weighing 365gm. Normal bilateral fallopian tubes, bilateral ovaries. Adhesive disease of colon to sidewall and fallopian tubes/ovaries. Periurethral tears, repaired with chromic.

## 2024-07-22 NOTE — BRIEF OPERATIVE NOTE - NSICDXBRIEFPROCEDURE_GEN_ALL_CORE_FT
PROCEDURES:  Laparoscopic hysterectomy, total, with BSO, uterus greater than 250 grams 22-Jul-2024 15:32:37  Anjelica Claudio

## 2024-07-22 NOTE — ASU DISCHARGE PLAN (ADULT/PEDIATRIC) - NS MD DC FALL RISK RISK
For information on Fall & Injury Prevention, visit: [email or web address] OR  [email or web address] OR  https://www.cdc.gov/steadi/patient.html

## 2024-07-22 NOTE — ASU DISCHARGE PLAN (ADULT/PEDIATRIC) - CARE PROVIDER_API CALL
Missy Romero  Gynecologic Oncology  [address] [place] [address], [address] [place] [address]  Phone: [phone]  Fax: [phone]  Follow Up Time:

## 2024-07-24 LAB — SURGICAL PATHOLOGY STUDY: SIGNIFICANT CHANGE UP

## 2024-07-29 ENCOUNTER — NON-APPOINTMENT (OUTPATIENT)
Age: 54
End: 2024-07-29

## 2024-07-29 NOTE — ASSESSMENT
[FreeTextEntry1] : 52 yo female, G 3 P 3003,( x 3) LMP  , with a history of uterine fibroids that are possibly increasing in size now presenting for second opinion with respect for surgical management. The patient is asymptomatic and denies any vaginal bleeding discharge or pelvic pain. She was evaluated by Dr. Rolon and informed that she needs an open hysterectomy. She does not desire surgery at this time and hence is here for a second opinion. Patient was evaluated on 24 where patient was symptomatic and requested definitive surgery. She stated that she has pain in LLQ and is no longer interested in conservative management.  s/p [facility] BSO on 24  Final Pathology: pecimen(s) Submitted Uterus, cervix, bilateral tubes and ovaries  Final Diagnosis Uterus, cervix, bilateral tubes and ovaries, total laparoscopic hysterectomy, bilateral salpingo-oophorectomy: -  Corpus uteri with multiple leiomyomas, the largest 8 cm in maximum dimension.  -  Benign endometrium, inactive. -  Small benign endometrial polyps of lower uterine segment. -  Cervix without significant diagnostic change. -  Bilateral fallopian tubes without significant diagnostic change. -  Bilateral ovaries without significant diagnostic change. Verified by: Anaya Carson M.D. (Electronic Signature) Reported on: 24 11:08 EDT, [facility], [address] [place] [address] Phone: [phone]   Fax: [phone]  Patient presents today for initial post op evaluation.

## 2024-07-30 DIAGNOSIS — D25.9 LEIOMYOMA OF UTERUS, UNSPECIFIED: ICD-10-CM

## 2024-07-30 DIAGNOSIS — N93.9 ABNORMAL UTERINE AND VAGINAL BLEEDING, UNSPECIFIED: ICD-10-CM

## 2024-07-30 DIAGNOSIS — I10 ESSENTIAL (PRIMARY) HYPERTENSION: ICD-10-CM

## 2024-07-30 DIAGNOSIS — N84.0 POLYP OF CORPUS UTERI: ICD-10-CM

## 2024-08-06 ENCOUNTER — APPOINTMENT (OUTPATIENT)
Dept: GYNECOLOGIC ONCOLOGY | Facility: CLINIC | Age: 54
End: 2024-08-06

## 2024-08-06 PROBLEM — Z86.018 HISTORY OF FIBROIDS, SUBMUCOSAL: Status: RESOLVED | Noted: 2023-04-25 | Resolved: 2024-08-06

## 2024-08-06 PROBLEM — Z90.710 STATUS POST HYSTERECTOMY WITH OOPHORECTOMY: Status: ACTIVE | Noted: 2024-08-06

## 2024-08-06 PROBLEM — I10 ESSENTIAL (PRIMARY) HYPERTENSION: Chronic | Status: ACTIVE | Noted: 2024-07-09

## 2024-08-06 PROBLEM — E78.00 PURE HYPERCHOLESTEROLEMIA, UNSPECIFIED: Chronic | Status: ACTIVE | Noted: 2024-07-22

## 2024-08-06 PROCEDURE — 99024 POSTOP FOLLOW-UP VISIT: CPT

## 2024-08-06 NOTE — ASSESSMENT
[FreeTextEntry1] : 54 yo female, G 3 P 3003,( x 3) LMP  , with a history of uterine fibroids that are possibly increasing in size now presenting for second opinion with respect for surgical management. The patient is asymptomatic and denies any vaginal bleeding discharge or pelvic pain. She was evaluated by Dr. Rolon and informed that she needs an open hysterectomy. She does not desire surgery at this time and hence is here for a second opinion. Patient was evaluated on 24 where patient was symptomatic and requested definitive surgery. She stated that she has pain in LLQ and is no longer interested in conservative management.  s/p [facility] BSO on 24  Final Pathology: pecimen(s) Submitted Uterus, cervix, bilateral tubes and ovaries  Final Diagnosis Uterus, cervix, bilateral tubes and ovaries,(365 grams) total laparoscopic hysterectomy, bilateral salpingo-oophorectomy: -  Corpus uteri with multiple leiomyomas, the largest 8 cm in maximum dimension.  -  Benign endometrium, inactive. -  Small benign endometrial polyps of lower uterine segment. -  Cervix without significant diagnostic change. -  Bilateral fallopian tubes without significant diagnostic change. -  Bilateral ovaries without significant diagnostic change. Verified by: Anaya Carson M.D. (Electronic Signature) Reported on: 24 11:08 EDT, [facility], [address] [place] [address] Phone: [phone]   Fax: [phone]  Patient presents today for initial post op evaluation.

## 2024-08-06 NOTE — DISCUSSION/SUMMARY
[Clean] : was clean [Dry] : was dry [Intact] : was intact [Erythema] : was not erythematous [Ecchymosis] : was not ecchymotic [Seroma] : had no seroma [None] : had no drainage [Normal Skin] : normal appearance [Firm] : soft [Tender] : nontender [Abnormal Bowel Sounds] : normal bowel sounds [Rebound] : no rebound tenderness [Incisional Hernia] : no incisional hernia [Guarding] : no guarding [Mass] : no palpable mass [External Genitalia Abnormal] : normal external genitalia [Vaginal Exam Abnormal] : normal vaginal exam [Doing Well] : is doing well

## 2024-08-06 NOTE — ASSESSMENT
[FreeTextEntry1] : 52 yo female, G 3 P 3003,( x 3) LMP  , with a history of uterine fibroids that are possibly increasing in size now presenting for second opinion with respect for surgical management. The patient is asymptomatic and denies any vaginal bleeding discharge or pelvic pain. She was evaluated by Dr. Rolon and informed that she needs an open hysterectomy. She does not desire surgery at this time and hence is here for a second opinion. Patient was evaluated on 24 where patient was symptomatic and requested definitive surgery. She stated that she has pain in LLQ and is no longer interested in conservative management.  s/p [facility] BSO on 24  Final Pathology: pecimen(s) Submitted Uterus, cervix, bilateral tubes and ovaries  Final Diagnosis Uterus, cervix, bilateral tubes and ovaries,(365 grams) total laparoscopic hysterectomy, bilateral salpingo-oophorectomy: -  Corpus uteri with multiple leiomyomas, the largest 8 cm in maximum dimension.  -  Benign endometrium, inactive. -  Small benign endometrial polyps of lower uterine segment. -  Cervix without significant diagnostic change. -  Bilateral fallopian tubes without significant diagnostic change. -  Bilateral ovaries without significant diagnostic change. Verified by: Anaya Carson M.D. (Electronic Signature) Reported on: 24 11:08 EDT, [facility], [address] [place] [address] Phone: [phone]   Fax: [phone]  Patient presents today for initial post op evaluation.

## 2024-10-10 ENCOUNTER — NON-APPOINTMENT (OUTPATIENT)
Age: 54
End: 2024-10-10

## 2024-10-18 ENCOUNTER — APPOINTMENT (OUTPATIENT)
Dept: GYNECOLOGIC ONCOLOGY | Facility: CLINIC | Age: 54
End: 2024-10-18
Payer: COMMERCIAL

## 2024-10-18 DIAGNOSIS — Z90.721 ACQUIRED ABSENCE OF BOTH CERVIX AND UTERUS: ICD-10-CM

## 2024-10-18 DIAGNOSIS — Z90.710 ACQUIRED ABSENCE OF BOTH CERVIX AND UTERUS: ICD-10-CM

## 2024-10-18 DIAGNOSIS — Z86.018 PERSONAL HISTORY OF OTHER BENIGN NEOPLASM: ICD-10-CM

## 2024-10-18 PROCEDURE — 99024 POSTOP FOLLOW-UP VISIT: CPT

## 2024-12-23 NOTE — ASU PATIENT PROFILE, ADULT - NS PRO MODE OF ARRIVAL
Writer called patient to update her that provider states that recent labs showed that, \"Clonazepam and Methadone on UDS- where from- there former Rx 8 months ago and we changed and I am managing.\" No answer at number. Writer left a message requesting a return call to discuss.    ambulatory

## 2025-05-09 ENCOUNTER — APPOINTMENT (OUTPATIENT)
Dept: GYNECOLOGIC ONCOLOGY | Facility: CLINIC | Age: 55
End: 2025-05-09
Payer: COMMERCIAL

## 2025-05-09 VITALS
WEIGHT: 180 LBS | SYSTOLIC BLOOD PRESSURE: 138 MMHG | HEIGHT: 62 IN | DIASTOLIC BLOOD PRESSURE: 97 MMHG | HEART RATE: 69 BPM | BODY MASS INDEX: 33.13 KG/M2

## 2025-05-09 DIAGNOSIS — R10.2 PELVIC AND PERINEAL PAIN: ICD-10-CM

## 2025-05-09 DIAGNOSIS — Z90.710 ACQUIRED ABSENCE OF BOTH CERVIX AND UTERUS: ICD-10-CM

## 2025-05-09 DIAGNOSIS — Z86.018 PERSONAL HISTORY OF OTHER BENIGN NEOPLASM: ICD-10-CM

## 2025-05-09 DIAGNOSIS — N95.2 POSTMENOPAUSAL ATROPHIC VAGINITIS: ICD-10-CM

## 2025-05-09 DIAGNOSIS — Z90.721 ACQUIRED ABSENCE OF BOTH CERVIX AND UTERUS: ICD-10-CM

## 2025-05-09 PROCEDURE — 99213 OFFICE O/P EST LOW 20 MIN: CPT

## 2025-05-09 PROCEDURE — 99459 PELVIC EXAMINATION: CPT

## 2025-07-28 ENCOUNTER — OUTPATIENT (OUTPATIENT)
Dept: OUTPATIENT SERVICES | Facility: HOSPITAL | Age: 55
LOS: 1 days | End: 2025-07-28
Payer: COMMERCIAL

## 2025-07-28 ENCOUNTER — RESULT REVIEW (OUTPATIENT)
Age: 55
End: 2025-07-28

## 2025-07-28 DIAGNOSIS — Z12.31 ENCOUNTER FOR SCREENING MAMMOGRAM FOR MALIGNANT NEOPLASM OF BREAST: ICD-10-CM

## 2025-07-28 PROCEDURE — 77063 BREAST TOMOSYNTHESIS BI: CPT

## 2025-07-28 PROCEDURE — 77067 SCR MAMMO BI INCL CAD: CPT

## 2025-07-28 PROCEDURE — 77063 BREAST TOMOSYNTHESIS BI: CPT | Mod: 26

## 2025-07-28 PROCEDURE — 77067 SCR MAMMO BI INCL CAD: CPT | Mod: 26

## 2025-07-29 DIAGNOSIS — Z12.31 ENCOUNTER FOR SCREENING MAMMOGRAM FOR MALIGNANT NEOPLASM OF BREAST: ICD-10-CM
